# Patient Record
Sex: FEMALE | Race: WHITE | NOT HISPANIC OR LATINO | URBAN - METROPOLITAN AREA
[De-identification: names, ages, dates, MRNs, and addresses within clinical notes are randomized per-mention and may not be internally consistent; named-entity substitution may affect disease eponyms.]

---

## 2017-01-03 DIAGNOSIS — M53.3 COCCYDYNIA: Primary | ICD-10-CM

## 2017-01-03 RX ORDER — HYDROMORPHONE HYDROCHLORIDE 2 MG/1
TABLET ORAL
Qty: 120 TABLET | Refills: 0 | Status: SHIPPED | OUTPATIENT
Start: 2017-01-03 | End: 2017-02-03

## 2017-01-03 NOTE — TELEPHONE ENCOUNTER
Jovita underwent Coccygectomy on 10-31-16 with Dr. Fink.  She presently takes 4 tabs 2mg Dilaudid daily, states no other medication has helped her pain and she also states she has other back pain that Dilaudid helps.  She last received Rx for #120 on 12/7/16, pt states the pharmacy only filled #101 so she is 19 pills short.  Discussed pain management with Tosha, refill Rx for #120 Dilaudid was mailed to pt's pharmacy [facility] in [place], [place].

## 2017-01-25 ENCOUNTER — OFFICE VISIT (OUTPATIENT)
Dept: ORTHOPEDICS | Facility: CLINIC | Age: 67
End: 2017-01-25

## 2017-01-25 VITALS — WEIGHT: 172 LBS | HEIGHT: 68 IN | BODY MASS INDEX: 26.07 KG/M2

## 2017-01-25 DIAGNOSIS — G57.02 PIRIFORMIS SYNDROME, LEFT: ICD-10-CM

## 2017-01-25 DIAGNOSIS — M53.3 COCCYDYNIA: Primary | ICD-10-CM

## 2017-01-25 ASSESSMENT — ENCOUNTER SYMPTOMS
INCREASED ENERGY: 1
ALTERED TEMPERATURE REGULATION: 1
BLOATING: 1
WEIGHT GAIN: 0
RECTAL BLEEDING: 1
HEARTBURN: 1
CONSTIPATION: 1
FEVER: 0
ARTHRALGIAS: 1
JOINT SWELLING: 0
CHILLS: 1
DIFFICULTY URINATING: 0
WEIGHT LOSS: 1
HALLUCINATIONS: 0
FATIGUE: 1
JAUNDICE: 0
MYALGIAS: 1
DIARRHEA: 1
RECTAL PAIN: 1
VOMITING: 0
MUSCLE CRAMPS: 0
BACK PAIN: 1
POLYPHAGIA: 0
FLANK PAIN: 0
HEMATURIA: 0
ABDOMINAL PAIN: 1
POLYDIPSIA: 0
BOWEL INCONTINENCE: 1
DECREASED APPETITE: 1
NAUSEA: 0
BLOOD IN STOOL: 1
MUSCLE WEAKNESS: 1
NECK PAIN: 1
NIGHT SWEATS: 1
DYSURIA: 0
STIFFNESS: 1

## 2017-01-25 NOTE — MR AVS SNAPSHOT
After Visit Summary   2017    Jovita Romero    MRN: [id number]           Patient Information     Date Of Birth          1950        Visit Information        Provider Department      2017 10:45 AM Tomas Fink MD [facility]        Today's Diagnoses     Coccydynia    -  1     Piriformis syndrome, left            Follow-ups after your visit        Additional Services     PAIN PT EVAL AND TREAT                 Your next 10 appointments already scheduled     2017  9:30 AM   (Arrive by 9:15 AM)   New Patient Visit with Samantha Puga MD   [facility] ([facility])    [address] [place] [address]  4th [facility] [id number] [phone]              Who to contact     Please call your clinic at [phone] to:    Ask questions about your health    Make or cancel appointments    Discuss your medicines    Learn about your test results    Speak to your doctor   If you have compliments or concerns about an experience at your clinic, or if you wish to file a complaint, please contact [facility] Patient Relations at [phone] or email us at [email or web address]         Additional Information About Your Visit        MyChart Information     Tujiat is an electronic gateway that provides easy, online access to your medical records. With Merc[employer], you can request a clinic appointment, read your test results, renew a prescription or communicate with your care team.     To sign up for Tujiat visit the website at [email or web address]   You will be asked to enter the access code listed below, as well as some personal information. Please follow the directions to create your username and password.     Your access code is: [email or web address]  Expires: 3/1/2017  6:31 AM     Your access code will  in 90 days. If you need help or a new code, please contact your  "[facility] or call [phone] for assistance.        Care EveryWhere ID     This is your Care EveryWhere ID. This could be used by other organizations to access your [place] medical records  [id number]        Your Vitals Were     Height BMI (Body Mass Index)                1.73 m (5' 8.11\") 26.07 kg/m2           Blood Pressure from Last 3 Encounters:   10/31/16 114/70    Weight from Last 3 Encounters:   01/25/17 78.019 kg (172 lb)   12/15/16 78.654 kg (173 lb 6.4 oz)   10/31/16 77.3 kg (170 lb 6.7 oz)              We Performed the Following     PAIN PT EVAL AND TREAT          Today's Medication Changes          These changes are accurate as of: 1/25/17 11:56 AM.  If you have any questions, ask your nurse or doctor.               Stop taking these medicines if you haven't already. Please contact your care team if you have questions.     HYDROcodone-acetaminophen 5-325 MG per tablet   Commonly known as:  NORCO   Stopped by:  Tomas Fink MD                    Primary Care Provider Office Phone # Fax #    Isidra Friend [phone]       [facility] [address] [facility] [address]        Thank you!     Thank you for choosing [facility]  for your care. Our goal is always to provide you with excellent care. Hearing back from our patients is one way we can continue to improve our services. Please take a few minutes to complete the written survey that you may receive in the mail after your visit with us. Thank you!             Your Updated Medication List - Protect others around you: Learn how to safely use, store and throw away your medicines at www.disposemymeds.org.          This list is accurate as of: 1/25/17 11:56 AM.  Always use your most recent med list.                   Brand Name Dispense Instructions for use    HYDROmorphone 2 MG tablet    DILAUDID    120 tablet    Take 1-2 tablets (2 mg) by mouth every 4 hours as " needed for moderate to severe pain.       LEVOTHYROXINE SODIUM PO      Take 75 mcg by mouth daily       LORAZEPAM PO      Take 1 mg by mouth 3 times daily       PARoxetine 20 MG tablet    PAXIL     Take 20 mg by mouth every morning       senna-docusate 8.6-50 MG per tablet    SENOKOT-S;PERICOLACE    30 tablet    Take 1-2 tablets by mouth 2 times daily Take while on oral narcotics to prevent or treat constipation.       UNKNOWN TO PATIENT      Sleeping pill (Quietapine) nightly

## 2017-01-25 NOTE — NURSING NOTE
"Reason For Visit:   Chief Complaint   Patient presents with     Surgical Followup     s/p coccygectomy 10/31/16       Primary MD: Isidra Friend  Ref. MD: Dr. Mccabe      Occupation retired.  Date of injury: none    Date of surgery: 10/31/16  Type of surgery: coccygectomy.  Smoker: No      Ht 1.73 m (5' 8.11\")  Wt 78.019 kg (172 lb)  BMI 26.07 kg/m2    Pain Assessment  Patient Currently in Pain: Yes  0-10 Pain Scale: 8  Primary Pain Location: Other (Comment) (coccyx area)  Pain Descriptors: Sharp  Alleviating Factors: Pain medication  Aggravating Factors: Sitting    Oswestry (BRISA) Questionnaire    OSWESTRY DISABILITY INDEX 1/25/2017   SECTION 1-PAIN INTENSITY 3  The pain is fairly severe at the moment.   SECTION 2-PERSONAL CARE (WASHING,DRESSING,ETC.) 1  I can look after myself normally but it is very painful.   SECTION 3-LIFTING 3  Pain prevents me from lifting heavy weights but I can manage light to medium weights if they are conveniently positioned.   SECTION 4-WALKING 1  Pain prevents me from walking more than one mile.   SECTION 5-SITTING 2  Pain prevents me from sitting for more than 1 hour   SECTION 6-STANDING 2  Pain prevents me from standing for more than 1 hour.   SECTION 7-SLEEPING 2  Because of pain I have less than 6 hours sleep.   SECTION 8-SEX LIFE (IF APPLICABLE) 1  My sex life is normal but causes some additional pain.   SECTION 9-SOCIAL LIFE 5  I have no social life because of pain.   SECTION 10-TRAVELING 2  Pain is bad but I am able to manage trips over two hours.   Oswestry Disability Index: Count 10   BRISA: Total Score = SUM (total for answered questions) 22   Computed Oswestry Score 44 (%)                      Numeric Rating Scale:  VAS Scores     VAS Survey 1/25/2017   What is your level of back pain during the last week: 8.5   What is your level of RIGHT leg pain during the last week: 7.0   What is your level of LEFT leg pain during the last week: 2.0   What is your level of neck pain " during the last week: 2.0   What is your level of RIGHT arm pain during the last week: -   What is your level of LEFT arm pain during the last week: -                Promis 10 Assessment    PROMIS 10 1/25/2017   In general, would you say your health is: Fair = 2   In general, would you say your quality of life is: Fair = 2   In general, how would you rate your physical health? Fair = 2   In general, how would you rate your mental health, including your mood and your ability to think? Good = 3   In general, how would you rate your satisfaction with your social activities and relationships? Fair = 2   In general, please rate how well you carry out your usual social activities and roles Fair = 2   To what extent are you able to carry out your everyday physical activities such as walking, climbing stairs, carrying groceries, or moving a chair? Mostly = 4   How often have you been bothered by emotional problems such as feeling anxious, depressed or irritable? Rarely = 4   How would you rate your fatigue on average? Mild = 4   How would you rate your pain on average?   0 = No Pain  to  10 = Worst Imaginable Pain 10   Global Physical Health Score : Raw Score 11 (SUM : G03 - G06 - G07 - G08)   Global Mentall Health Score : Raw Score 11 (SUM : G02 - G04 - G05 - G10)   Total (Physical + Mental Health Score) 22

## 2017-01-25 NOTE — PROGRESS NOTES
HISTORY OF PRESENT ILLNESS:  She returns now approximately 3 months out from a coccygectomy.  She did have some wound healing problems.  This has been treated with local wound care and has gone on to heal.  She is still having persistent pain in the area.  She feels like she really has not gotten any better.  Visual analog pain scale today is an 8.  Oswestry Disability Index score is 44%.      PHYSICAL EXAMINATION:  Shows a well-developed, well-nourished female in moderate discomfort.  Evaluation of her incision shows it is well healed today.  She is not particularly tender over the incision, but she is exquisitely tender lateral to the sacrum and tracking along the left to the greater trochanter consistent with a piriformis muscle.  Lying her prone and externally rotating her lower extremity aggravates the symptoms.      ASSESSMENT:     1.  Coccygodynia, status post coccygectomy.   2.  Piriformis syndrome.      PLAN:  I would like to have her see Dr. Warner in the Pain Clinic for medical management of this.  He may or may not choose to use physical therapy and/or injections to try to help minimize his pain.  I would like to see how she proceeds with this.  I have no specific need to see her back unless she has a change in what is going on.  Radiograph obtained today shows that we had successfully removed the coccyx and the incision is now well-healed.

## 2017-01-25 NOTE — Clinical Note
1/25/2017       RE: Jovita Romero  [address] [place] [address]     Dear Colleague,    Thank you for referring your patient, Jovita Romero, to the [facility] at [facility]. Please see a copy of my visit note below.    HISTORY OF PRESENT ILLNESS:  She returns now approximately 3 months out from a coccygectomy.  She did have some wound healing problems.  This has been treated with local wound care and has gone on to heal.  She is still having persistent pain in the area.  She feels like she really has not gotten any better.  Visual analog pain scale today is an 8.  Oswestry Disability Index score is 44%.      PHYSICAL EXAMINATION:  Shows a well-developed, well-nourished female in moderate discomfort.  Evaluation of her incision shows it is well healed today.  She is not particularly tender over the incision, but she is exquisitely tender lateral to the sacrum and tracking along the left to the greater trochanter consistent with a piriformis muscle.  Lying her prone and externally rotating her lower extremity aggravates the symptoms.      ASSESSMENT:     1.  Coccygodynia, status post coccygectomy.   2.  Piriformis syndrome.      PLAN:  I would like to have her see Dr. Warner in the Pain Clinic for medical management of this.  He may or may not choose to use physical therapy and/or injections to try to help minimize his pain.  I would like to see how she proceeds with this.  I have no specific need to see her back unless she has a change in what is going on.  Radiograph obtained today shows that we had successfully removed the coccyx and the incision is now well-healed.           Again, thank you for allowing me to participate in the care of your patient.      Sincerely,    Tomas Fink MD

## 2017-02-03 DIAGNOSIS — M53.3 COCCYDYNIA: Primary | ICD-10-CM

## 2017-02-03 RX ORDER — HYDROMORPHONE HYDROCHLORIDE 2 MG/1
TABLET ORAL
Qty: 120 TABLET | Refills: 0 | Status: SHIPPED | OUTPATIENT
Start: 2017-02-03 | End: 2017-03-24

## 2017-02-03 NOTE — TELEPHONE ENCOUNTER
Rates pain 8. See dictation.  Pain clinic appt. Pending.  Refilled Dilaudid #[id number].  V.RUSTY./Tosha Johns RN.

## 2017-02-06 ENCOUNTER — TELEPHONE (OUTPATIENT)
Dept: ANESTHESIOLOGY | Facility: CLINIC | Age: 67
End: 2017-02-06

## 2017-02-06 NOTE — TELEPHONE ENCOUNTER
----- Message from Brenda Espinoza RN sent at 2/6/2017  3:03 PM CST -----  No need to overbook     ----- Message -----     From: Saad Moreno, GUERO     Sent: 2/3/2017   2:07 PM       To: Brenda Espinoza, RN, #    Hi Dr. Warner,    This patient was referred to you from Dr. Fink to evaluate. She was scheduled with Dr. Puga by mistake by the call center, but you also have no openings till April. Where would you like me to double book or? Or she can just wait till April also... What do you prefer?    Brenda, can you take care of this? I won't be here next week.    Thank you,   Saad

## 2017-02-07 ENCOUNTER — PRE VISIT (OUTPATIENT)
Dept: ANESTHESIOLOGY | Facility: CLINIC | Age: 67
End: 2017-02-07

## 2017-02-07 NOTE — TELEPHONE ENCOUNTER
1.  Date/reason for appt:2/15/17, Coccydynia   2.  Referring provider: Tomas Fink  3.  Call to patient (Yes / No - short description): No, referred  4.  Previous care at / records requested from:   [facility]- office notes and imaging are in epic.

## 2017-03-24 DIAGNOSIS — M53.3 COCCYDYNIA: ICD-10-CM

## 2017-03-24 RX ORDER — HYDROMORPHONE HYDROCHLORIDE 2 MG/1
TABLET ORAL
Qty: 120 TABLET | Refills: 0 | Status: SHIPPED | OUTPATIENT
Start: 2017-03-24 | End: 2017-04-20

## 2017-03-24 NOTE — TELEPHONE ENCOUNTER
Pain clinic appt. Scheduled for April.  Refilled Dilaudid #120 & mailed to pt.  JD./Tosha Johns RN.

## 2017-04-20 DIAGNOSIS — M53.3 COCCYDYNIA: ICD-10-CM

## 2017-04-20 RX ORDER — HYDROMORPHONE HYDROCHLORIDE 2 MG/1
TABLET ORAL
Qty: 120 TABLET | Refills: 0 | Status: SHIPPED | OUTPATIENT
Start: 2017-04-20 | End: 2017-06-07

## 2017-04-20 NOTE — TELEPHONE ENCOUNTER
Rates pain 7.  Might have to reschedule pain clinic appt. Due to family  in [place].  Refilled Dilaudid #120 signed   by  & mailed to pt.   Call back prn.  Pt. Agreed.  V.O.MARYW.TANYA./Tosha Johns RN.

## 2017-04-26 ENCOUNTER — OFFICE VISIT (OUTPATIENT)
Dept: ANESTHESIOLOGY | Facility: CLINIC | Age: 67
End: 2017-04-26
Attending: ORTHOPAEDIC SURGERY

## 2017-04-26 VITALS
DIASTOLIC BLOOD PRESSURE: 88 MMHG | OXYGEN SATURATION: 93 % | HEART RATE: 97 BPM | HEIGHT: 68 IN | BODY MASS INDEX: 27.28 KG/M2 | WEIGHT: 180 LBS | SYSTOLIC BLOOD PRESSURE: 139 MMHG

## 2017-04-26 DIAGNOSIS — F11.90 CHRONIC, CONTINUOUS USE OF OPIOIDS: ICD-10-CM

## 2017-04-26 DIAGNOSIS — M46.1 SACROILIITIS (H): ICD-10-CM

## 2017-04-26 DIAGNOSIS — G89.29 COPING WITH CHRONIC PAIN: ICD-10-CM

## 2017-04-26 DIAGNOSIS — M96.1 FAILED BACK SURGICAL SYNDROME: ICD-10-CM

## 2017-04-26 DIAGNOSIS — M47.816 SPONDYLOSIS OF LUMBAR REGION WITHOUT MYELOPATHY OR RADICULOPATHY: Primary | ICD-10-CM

## 2017-04-26 RX ORDER — CALCIUM CARBONATE 500(1250)
500 TABLET ORAL DAILY
COMMUNITY

## 2017-04-26 ASSESSMENT — ENCOUNTER SYMPTOMS
TREMORS: 0
RECTAL PAIN: 1
BLOATING: 0
WEIGHT GAIN: 1
ABDOMINAL PAIN: 1
MEMORY LOSS: 0
BLOOD IN STOOL: 0
INCREASED ENERGY: 1
CONSTIPATION: 1
DEPRESSION: 1
SPEECH CHANGE: 0
JAUNDICE: 0
FATIGUE: 1
VOMITING: 0
WEIGHT LOSS: 0
DYSURIA: 0
PANIC: 0
FLANK PAIN: 0
DECREASED APPETITE: 0
DISTURBANCES IN COORDINATION: 1
TINGLING: 0
DIZZINESS: 1
PARALYSIS: 0
DIARRHEA: 0
WEAKNESS: 1
BOWEL INCONTINENCE: 0
NERVOUS/ANXIOUS: 1
HALLUCINATIONS: 0
NAUSEA: 0
CHILLS: 0
RECTAL BLEEDING: 0
HEARTBURN: 1
ALTERED TEMPERATURE REGULATION: 1
DECREASED CONCENTRATION: 0
HOT FLASHES: 1
SEIZURES: 0
POLYPHAGIA: 0
DIFFICULTY URINATING: 1
POLYDIPSIA: 0
DECREASED LIBIDO: 1
HEADACHES: 1
INSOMNIA: 1
FEVER: 0
LOSS OF CONSCIOUSNESS: 0
HEMATURIA: 0
NUMBNESS: 0
NIGHT SWEATS: 1

## 2017-04-26 ASSESSMENT — PAIN SCALES - GENERAL: PAINLEVEL: EXTREME PAIN (8)

## 2017-04-26 NOTE — MR AVS SNAPSHOT
After Visit Summary   4/26/2017    Jovita Romero    MRN: [id number]           Patient Information     Date Of Birth          1950        Visit Information        Provider Department      4/26/2017 8:00 AM Dwayne Warner DO [facility]        Today's Diagnoses     Spondylosis of lumbar region without myelopathy or radiculopathy    -  1    Failed back surgical syndrome        Sacroiliitis (H)        Coping with chronic pain        Chronic, continuous use of opioids          Care Instructions    1. Continue taking your medications as prescribed.  Add tylenol 1000mg every 8 hours for pain.  Apply lidocaine cream to painful areas of back.      2. Referral to pain physical therapy for comprehensive evaluation of low back pain and TENS unit.  If you have not been contacted after 2 business days, please call [phone] to schedule.      3. Referral to pain psychologist Dr. Marcy Woodson for pain coping skills training.  Please call Jacy to schedule at [phone].    Follow up: 1 month in clinic       To speak with a nurse, schedule/reschedule/cancel a clinic appointment, or request a medication refill call: [phone]     You can also reach us by DealerSocket: [email or web address]    For refills, please call on Monday, 1 week before your medication runs out. The doctors are not always in clinic, so this gives us time to get your prescriptions ready.  Please let us know the name of the medication you are requesting a refill of.                                   Follow-ups after your visit        Additional Services     [facility]       Your provider has referred you to: [facility]. Please call Jacy to schedule an appointment at [phone].    Clinic is located: [facility] [address] [place] [address] [id number] 4th Floor  [facility]  [place] [address] [facility]   Comprehensive Pain Program         Why should you see a pain psychologist?       Because you have CHRONIC  Pain.         Chronic pain produces unique challenges that effect your whole body and almost every area of your life.    Chronic pain   (different from acute pain )  can change your  relationships, your work, your sleep, your finances, enjoyment of your hobbies and activities.  Your  sense of self-worth can  be impacted by   chronic pain.    Often, anger or anxiety or depression are triggered by  chronic pain.   Your normal coping strategies   ( having fun  or  exercise) may not be possible any more, or only in very limited doses.    You may be surprised to know  that pain psychology is a psychological specialty. Pain psychologists typically have a PhD in clinical psychology and training  in specialized treatment tools for improving pain.   Pain psychology is different from seeing a therapist for a primary mental health problem.   In pain psychology,  the focus is on your body, and its more about teaching than having a series of conversations.    Learning techniques to down regulate your Sympathetic  Nervous System and manage  Central Sensitization Syndrome are the focus of pain psychology.    Marcy Woodson, PhD LP  Is the medical psychologist for the  Comprehensive Pain Program.    She has over 25 years of experience working with chronic pain patients.   Prior to being a psychologist,  Dr. Woodson was a physician assistant.  And so she brings a dual background in medicine and psychology to assist chronic pain patients.       The goal of the  Comprehensive Pain Program is to work as a multi disciplinary team.   Physicians,  Nurses,  Physical therapists,  Pharmacists,    Psychologists and other  Professionals are working in tandem to provide the breadth of care necessary to manage the many challenges of chronic pain.            PAIN PT EVAL AND TREAT                "  Your next 10 appointments already scheduled     May 24, 2017 10:20 AM CDT   (Arrive by 10:05 AM)   Return Visit with Dwayne Warner DO   [facility] ([facility])    [address] [id number] [phone]              Who to contact     Please call your clinic at [phone] to:    Ask questions about your health    Make or cancel appointments    Discuss your medicines    Learn about your test results    Speak to your doctor   If you have compliments or concerns about an experience at your clinic, or if you wish to file a complaint, please contact [facility] Patient Relations at [phone] or email us at [email or web address]         Additional Information About Your Visit        DateMyFamily.comharIncujector Information     [employer] is an electronic gateway that provides easy, online access to your medical records. With [employer], you can request a clinic appointment, read your test results, renew a prescription or communicate with your care team.     To sign up for [employer] visit the website at [email or web address]   You will be asked to enter the access code listed below, as well as some personal information. Please follow the directions to create your username and password.     Your access code is: [email or web address]  Expires: 2017  6:30 AM     Your access code will  in 90 days. If you need help or a new code, please contact your [facility] or call [phone] for assistance.        Care EveryWhere ID     This is your Care EveryWhere ID. This could be used by other organizations to access your [place] medical records  [id number]        Your Vitals Were     Pulse Height Pulse Oximetry BMI (Body Mass Index)          97 1.715 m (5' 7.5\") 93% 27.78 kg/m2         Blood Pressure from Last 3 Encounters:   17 139/88   10/31/16 114/70    Weight " from Last 3 Encounters:   04/26/17 81.6 kg (180 lb)   01/25/17 78 kg (172 lb)   12/15/16 78.7 kg (173 lb 6.4 oz)              We Performed the Following     [facility]     PAIN PT EVAL AND TREAT        Primary Care Provider Office Phone # Fax #    Isidra Friend [phone]       [facility] [address] [place] [place] [address]        Thank you!     Thank you for choosing [facility]  for your care. Our goal is always to provide you with excellent care. Hearing back from our patients is one way we can continue to improve our services. Please take a few minutes to complete the written survey that you may receive in the mail after your visit with us. Thank you!             Your Updated Medication List - Protect others around you: Learn how to safely use, store and throw away your medicines at www.disposemymeds.org.          This list is accurate as of: 4/26/17  9:31 AM.  Always use your most recent med list.                   Brand Name Dispense Instructions for use    calcium carbonate 500 MG tablet    OS-POOJA 500 mg [place]. [place]     Take 500 mg by mouth daily       HYDROmorphone 2 MG tablet    DILAUDID    120 tablet    Take 1-2 tablets (2 mg) by mouth every 4 hours as needed for moderate to severe pain.       LEVOTHYROXINE SODIUM PO      Take 75 mcg by mouth daily       LORAZEPAM PO      Take 1 mg by mouth 3 times daily       PARoxetine 20 MG tablet    PAXIL     Take 20 mg by mouth every morning Reported on 4/26/2017       QUETIAPINE FUMARATE PO      Take 25 mg by mouth At Bedtime       senna-docusate 8.6-50 MG per tablet    SENOKOT-S;PERICOLACE    30 tablet    Take 1-2 tablets by mouth 2 times daily Take while on oral narcotics to prevent or treat constipation.       vitamin B complex with vitamin C Tabs tablet      Take 1 tablet by mouth daily

## 2017-04-26 NOTE — PROGRESS NOTES
"I had the pleasure of meeting Ms. Jovita Romero on 4/26/2017 in the outpatient pain clinic in consult for Dr. Fink with regards to her concern for piriformis syndrome  HPI:  67yo female with past medical history of hearing loss in her left ear, lumbar spondylosis s/p lumbar spine surgery in 2015, followed by coccygectomy in October for cocydynia which was complicated by slow wound healing presents for persistent low back.   The pain began 9 years ago after a long motorcycle ride.  She has seen a pain management specialist in the past initially  at the [facility] but failed to improve with PT and interventional procedures and ultimately chose to have a lumbar fusion.  She says she feels her back pain has never relented despite surgery and that every since her coccygectomy her ability to function without pain has been compromised.  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  She is emotionally labile today and very frustrated about the amount of pain she deals with on a daily basis.   Her main limiting factor is that she lives 1[place]5 hours away but wants to center her care at the [facility].   She struggles taking any medications and feels she is very sensitive to side effects.    Location:low back, bilateral buttocks (L>R), pain will radiate down to her bilateral toes at night.  Quality: pinching, burning  Severity: 8/10  Timing: constant with flare ups  Duration: years  Context: lumbar spondylosis, surgery  Aggravating Factors:walking makes it stiff and \"pulling\"  \"I am feeling every screw and bolt when I walk\".  Bending over.  transitioning from sitting to standing.  Relieving Factors:ice  Associated Signs/Symptoms: leg weakness, sometimes paresthesias sensations at night. \"constipation from pills\".    she has tried the following therapies for her pain:  Medications:   Current:  -Hydromorphone 2mg q4hrs - " "averages 2-4 a day - causing constipation.  She does get analgesic benefit.  -Ibuprofen 800mg 2-3x a day - not sure if working.  -lidocaine cream on coccyx area - helps for an hour.  Past:  -Gabapentin - caused mental status changes  -Hydrocodone - mild relief  -Tylenol - no relief  -She says she may have tried flexeril with no success, cannot remember other MR's  -Cymbalta - no effect, was on for 1 month.  She reports she was getting nauseated on it. Can't recall any other SNRI's or TCA's.  -No other prescription NSAIDs, tried  Physical Therapy:  -Swimming PT - reports water therapy was very helpful  -Land based PT - completed a back class in 2016 - she does her HEP daily.  Injections/Interventions:  She had a series of JAVIER's in the late 2000's with no effect  Pain Psychology:  - she did not have a favorable experience with a pain psychologist at her previous pain clinic \"he didn't mention my pain at all\".    Other Modalities:  Chiropractic care:Yes - not helpful  Acupuncture:Yes - was helpful  TENS:yes - before her surgeries - not helful  Water Based Therapy:yes - helpful  Meditation:not officially  Yoga:No      Past Medical History:   Diagnosis Date     Other chronic pain     Lower back and tailbone     Thyroid disease     past medical history reviewed with patient.   Past Surgical History:   Procedure Laterality Date     BACK SURGERY       C STOMACH SURGERY PROCEDURE UNLISTED       COCCYGECTOMY N/A 10/31/2016    Procedure: COCCYGECTOMY;  Surgeon: Tomas Fink MD;  [facility]     COLONOSCOPY       GENITOURINARY SURGERY      Bladder sling     HC SACROPLASTY      past surgical history reviewed with patient.   Medications:  Current Outpatient Prescriptions   Medication     QUETIAPINE FUMARATE PO     calcium carbonate (OS-POOJA 500 MG [place]. [place]) 500 MG tablet     vitamin B complex with vitamin C (VITAMIN  B COMPLEX) TABS tablet     HYDROmorphone (DILAUDID) 2 MG tablet     LORAZEPAM PO     LEVOTHYROXINE " SODIUM PO     senna-docusate (SENOKOT-S;PERICOLACE) 8.6-50 MG per tablet     PARoxetine (PAXIL) 20 MG tablet     No current facility-administered medications for this visit.      [place] and [place] Prescription Monitoring Program reviewed and no aberrancies noted.     Allergies:     Allergies   Allergen Reactions     Bupropion GI Disturbance     Cymbalta Other (See Comments)     fatigue     Fentanyl      Fentanyl patch makes her pass out     Lidoderm [Lidocaine] Other (See Comments)     Drowsiness from lidoderm patch     Morphine Nausea and Vomiting     Penicillins Hives     Percocet [Oxycodone-Acetaminophen] Nausea and Vomiting     Topiramate Nausea     Family History:  family history includes Anxiety Disorder in her sister; Depression in her brother, sister, sister, and sister.  Social history: she lives in [place], [place] with her . she is not currently working and is not on disability.    Smoking: denies. Alcohol: only on special occasions. Street drugs: denies.     ROS:  Skin: negative for rash or swelling  Eyes: negative for double vision or blurry vision  Ears/Nose/Throat: negative for dysphagia or sore throat  Respiratory: negative for shortness of breath or cough  Cardiovascular: negative for chest pain or dyspnea on exertion  Gastrointestinal: negative for abdominal pain, nausea, decreased appetite or bowel incontinence  Genitourinary: negative for bladder incontinence, hematuria and frequent urination  Musculoskeletal:positive for joint pain, myalgias, low back pain and negative for cervical spine pain.  Neurologic: negative for weakness, + for occasional numbness or paresthesias down her bilateral lower extremities at night.  Psychiatric:+ for depression, negative for anxiety and SI/HI  Hematologic/Lymphatic/Immunologic: Negative for anemia, easy bruising bleeding, frequent infections.  Endocrine: positive for thyroid abnormalities and negative for diabetes    Objective:   /88  Pulse 97  Ht  "1.715 m (5' 7.5\")  Wt 81.6 kg (180 lb)  SpO2 93%  BMI 27.78 kg/m2  Body mass index is 27.78 kg/(m^2).  General: In mild emotional distress  Mental status: tearful affect but pleasant  Head: Atraumatic, normocephalic  Eyes: Extra-ocular movements grossly intact, no scleral icterus  Cardiovascular: Regular rate  Respiratory: No respiratory distress  Abdomen:soft, non-distended  Msk:   Lumbosacral Spine:    Well healed surgical scars in lumbar and sacral areas, ROM limited in flexion and extension.  Antalgic Gait,  -TTP in the bilateral lumbar paraspinal muscles.  +TTP of the SI joint bilaterally.   -modified gaenslans bilaterally, -Dom's finger bilaterally,  -SLR bilaterally.   +Wilber on left for low back and sacral areas.    Negative pace sign, negative lasegue's sign and equivocal Freiberg's sign bilaterally.       Strength 5/5 for bilateral hip flexion, knee extension, dorsiflexion, EHL, and plantarflexion. Sensation intact to light touch throughout the L2-S1 dermatomes of the bilateral lower extremities. Reflexes 2+/4 and symmetric for bilateral patellar, achilles.  Negative babinski bilaterally. No clonus on ankle jerk  Skin: No rashes or lesions noted on exposed areas of skin  Lymph: no supraclavicular lymphadenopathy    Imaging:   Exam: 2 views of the pelvis dated 6/1/2016.     Comparison: Radiographs dated 1/19/2016.     Clinical history: Sacroiliitis.     Findings: 2 views of the pelvis were obtained. The sacroiliac joints  are patent. No erosive changes. Partially visualized hardware in the  lumbar spine. Subtle sclerosis at the sacroiliac joints.         Impression: No acute bone abnormality in the visualized pelvis on  plain radiographs.    Exam: 2 views of the lumbar spine dated 6/1/2016.     Comparison: Outside radiographs dated 7/1/2015.     Clinical history: Status post L3-L4 and L4-L5 spine spinal fusion.     Findings: AP and lateral views of the lumbar spine were obtained.  There are 5 lumbar " type vertebral bodies for the purposes of this  dictation. Postsurgical changes of attempted anterior posterior fusion  in the lumbar spine with instrumentation noted from L3-L5 with  interbody fusion devices at these levels. The hardware appears intact.  No compression fractures.         Impression: Stable post surgical changes of attempted anterior  posterior fusion in the lumbar spine with instrumentation noted  posteriorly from L3-L5.    Assessment:  1. Chronic low back pain 2/2 lumbar spondylosis and coccydynia - s/p lumbar fusion and coccygectomy  2. Sacroiliitis (Piriformis syndrome a lesser possibility but can't rule out at this point)  3. Coping with chronic pain  4. Depression    Plan:   1. Patient education: I went over the above diagnoses and treatment plan with her and answered all of her questions.  2. Imaging review: I reviewed the above XRs with her   3. Interventions: She is apprehensive about pursuing injection therapy at this time.   When she returns we can discuss SIJ injections, and possibly a trial of piriformis injections although her PE results are either negative or equivocal for this.  4. Medications  1. Continue pain medications as prescribed.  I would add scheduled tylenol 1000mg q8hrs for now for greater analgesic effect.  5. Exercise program: will refer for a Pain PT evaluation and treatment and for a TENS unit.  She is to continue doing her HEP daily.  6. Behavioral Psychology: Will place a referral to Dr. Woodson for coping skills training.   She is wiling to drive 1.5 miles to see her because she feels that there is no one near her who can provide her help.   7. Further Diagnostic Testing/Imaging: None at this time  8. Referrals: PT and Pain Psych  9. Follow up: 1 month    Total time spent was  60 minutes, and more than 50% of face to face time was spent in counseling and/or coordination of care regarding the above plan.    Thank you for the consult.   Dwayne Warner DO  Assistant  Professor  [facility]  Pain Management  Department of Anesthesiology        Answers for HPI/ROS submitted by the patient on 4/26/2017   General Symptoms: Yes  Skin Symptoms: No  HENT Symptoms: No  EYE SYMPTOMS: No  HEART SYMPTOMS: No  LUNG SYMPTOMS: No  INTESTINAL SYMPTOMS: Yes  URINARY SYMPTOMS: Yes  GYNECOLOGIC SYMPTOMS: Yes  BREAST SYMPTOMS: No  SKELETAL SYMPTOMS: No  BLOOD SYMPTOMS: No  NERVOUS SYSTEM SYMPTOMS: Yes  MENTAL HEALTH SYMPTOMS: Yes  Fever: No  Loss of appetite: No  Weight loss: No  Weight gain: Yes  Fatigue: Yes  Night sweats: Yes  Chills: No  Increased stress: No  Excessive hunger: No  Excessive thirst: No  Feeling hot or cold when others believe the temperature is normal: Yes  Loss of height: No  Post-operative complications: Yes  Surgical site pain: Yes  Hallucinations: No  Change in or Loss of Energy: Yes  Hyperactivity: Yes  Confusion: No  Heart burn or indigestion: Yes  Nausea: No  Vomiting: No  Abdominal pain: Yes  Bloating: No  Constipation: Yes  Diarrhea: No  Blood in stool: No  Black stools: No  Rectal or Anal pain: Yes  Fecal incontinence: No  Rectal bleeding: No  Yellowing of skin or eyes: No  Vomit with blood: No  Change in stools: No  Hemorrhoids: No  Trouble holding urine or incontinence: Yes  Pain or burning: No  Trouble starting or stopping: No  Increased frequency of urination: Yes  Blood in urine: No  Decreased frequency of urination: No  Frequent nighttime urination: Yes  Flank pain: No  Difficulty emptying bladder: Yes  Trouble with coordination: Yes  Dizziness or trouble with balance: Yes  Fainting or black-out spells: No  Memory loss: No  Headache: Yes  Seizures: No  Speech problems: No  Tingling: No  Tremor: No  Weakness: Yes  Difficulty walking: Yes  Paralysis: No  Numbness: No  Bleeding or spotting between periods: No  Heavy or painful periods: No  Irregular periods: No  Vaginal discharge: No  Hot flashes: Yes  Vaginal dryness: Yes  Genital ulcers: No  Reduced  libido: Yes  Painful intercourse: Yes  Difficulty with sexual arousal: No  Post-menopausal bleeding: No  Nervous or Anxious: Yes  Depression: Yes  Trouble sleeping: Yes  Trouble thinking or concentrating: No  Mood changes: No  Panic attacks: No

## 2017-04-26 NOTE — LETTER
"4/26/2017       RE: Jovita Romero  [address] [place] [address]     Dear Colleague,    Thank you for referring your patient, Jovita Romero, to the [facility] CL[facility] FOR COMPREHENSIVE PAIN MANAGEMENT at [facility]. Please see a copy of my visit note below.    I had the pleasure of meeting Ms. Jovita Romero on 4/26/2017 in the outpatient pain clinic in consult for Dr. Fink with regards to her concern for piriformis syndrome    HPI:  65yo female with past medical history of hearing loss in her left ear, lumbar spondylosis s/p lumbar spine surgery in 2015, followed by coccygectomy in October for cocydynia which was complicated by slow wound healing presents for persistent low back.   The pain began 9 years ago after a long motorcycle ride.  She has seen a pain management specialist in the past initially  at the [facility] but failed to improve with PT and interventional procedures and ultimately chose to have a lumbar fusion.  She says she feels her back pain has never relented despite surgery and that every since her coccygectomy her ability to function without pain has been compromised.  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  She is emotionally labile today and very frustrated about the amount of pain she deals with on a daily basis.   Her main limiting factor is that she lives 1[place]5 hours away but wants to center her care at the [place].   She struggles taking any medications and feels she is very sensitive to side effects.    Location:low back, bilateral buttocks (L>R), pain will radiate down to her bilateral toes at night.  Quality: pinching, burning  Severity: 8/10  Timing: constant with flare ups  Duration: years  Context: lumbar spondylosis, surgery  Aggravating Factors:walking makes it stiff and \"pulling\"  \"I am feeling every screw and bolt when I " "walk\".  Bending over.  transitioning from sitting to standing.  Relieving Factors:ice  Associated Signs/Symptoms: leg weakness, sometimes paresthesias sensations at night. \"constipation from pills\".    she has tried the following therapies for her pain:  Medications:   Current:  -Hydromorphone 2mg q4hrs - averages 2-4 a day - causing constipation.  She does get analgesic benefit.  -Ibuprofen 800mg 2-3x a day - not sure if working.  -lidocaine cream on coccyx area - helps for an hour.  Past:  -Gabapentin - caused mental status changes  -Hydrocodone - mild relief  -Tylenol - no relief  -She says she may have tried flexeril with no success, cannot remember other MR's  -Cymbalta - no effect, was on for 1 month.  She reports she was getting nauseated on it. Can't recall any other SNRI's or TCA's.  -No other prescription NSAIDs, tried  Physical Therapy:  -Swimming PT - reports water therapy was very helpful  -Land based PT - completed a back class in 2016 - she does her HEP daily.  Injections/Interventions:  She had a series of JAVIER's in the late 2000's with no effect  Pain Psychology:  - she did not have a favorable experience with a pain psychologist at her previous pain clinic \"he didn't mention my pain at all\".    Other Modalities:  Chiropractic care:Yes - not helpful  Acupuncture:Yes - was helpful  TENS:yes - before her surgeries - not helful  Water Based Therapy:yes - helpful  Meditation:not officially  Yoga:No      Past Medical History:   Diagnosis Date     Other chronic pain     Lower back and tailbone     Thyroid disease     past medical history reviewed with patient.   Past Surgical History:   Procedure Laterality Date     BACK SURGERY       C STOMACH SURGERY PROCEDURE UNLISTED       COCCYGECTOMY N/A 10/31/2016    Procedure: COCCYGECTOMY;  Surgeon: Tomas Fink MD;  [facility]     COLONOSCOPY       GENITOURINARY SURGERY      Bladder sling     HC SACROPLASTY      past surgical history reviewed with " patient.   Medications:  Current Outpatient Prescriptions   Medication     QUETIAPINE FUMARATE PO     calcium carbonate (OS-POOJA 500 MG [place]. [place]) 500 MG tablet     vitamin B complex with vitamin C (VITAMIN  B COMPLEX) TABS tablet     HYDROmorphone (DILAUDID) 2 MG tablet     LORAZEPAM PO     LEVOTHYROXINE SODIUM PO     senna-docusate (SENOKOT-S;PERICOLACE) 8.6-50 MG per tablet     PARoxetine (PAXIL) 20 MG tablet     No current facility-administered medications for this visit.      [place] and [place] Prescription Monitoring [place] reviewed and no aberrancies noted.     Allergies:     Allergies   Allergen Reactions     Bupropion GI Disturbance     Cymbalta Other (See Comments)     fatigue     Fentanyl      Fentanyl patch makes her pass out     Lidoderm [Lidocaine] Other (See Comments)     Drowsiness from lidoderm patch     Morphine Nausea and Vomiting     Penicillins Hives     Percocet [Oxycodone-Acetaminophen] Nausea and Vomiting     Topiramate Nausea     Family History:  family history includes Anxiety Disorder in her sister; Depression in her brother, sister, sister, and sister.  Social history: she lives in [place], [place] with her . she is not currently working and is not on disability.    Smoking: denies. Alcohol: only on special occasions. Street drugs: denies.     ROS:  Skin: negative for rash or swelling  Eyes: negative for double vision or blurry vision  Ears/Nose/Throat: negative for dysphagia or sore throat  Respiratory: negative for shortness of breath or cough  Cardiovascular: negative for chest pain or dyspnea on exertion  Gastrointestinal: negative for abdominal pain, nausea, decreased appetite or bowel incontinence  Genitourinary: negative for bladder incontinence, hematuria and frequent urination  Musculoskeletal:positive for joint pain, myalgias, low back pain and negative for cervical spine pain.  Neurologic: negative for weakness, + for occasional numbness or paresthesias down her bilateral  "lower extremities at night.  Psychiatric:+ for depression, negative for anxiety and SI/HI  Hematologic/Lymphatic/Immunologic: Negative for anemia, easy bruising bleeding, frequent infections.  Endocrine: positive for thyroid abnormalities and negative for diabetes    Objective:   /88  Pulse 97  Ht 1.715 m (5' 7.5\")  Wt 81.6 kg (180 lb)  SpO2 93%  BMI 27.78 kg/m2  Body mass index is 27.78 kg/(m^2).  General: In mild emotional distress  Mental status: tearful affect but pleasant  Head: Atraumatic, normocephalic  Eyes: Extra-ocular movements grossly intact, no scleral icterus  Cardiovascular: Regular rate  Respiratory: No respiratory distress  Abdomen:soft, non-distended  Msk:   Lumbosacral Spine:    Well healed surgical scars in lumbar and sacral areas, ROM limited in flexion and extension.  Antalgic Gait,  -TTP in the bilateral lumbar paraspinal muscles.  +TTP of the SI joint bilaterally.   -modified gaenslans bilaterally, -Dom's finger bilaterally,  -SLR bilaterally.   +Wilber on left for low back and sacral areas.    Negative pace sign, negative lasegue's sign and equivocal Freiberg's sign bilaterally.       Strength 5/5 for bilateral hip flexion, knee extension, dorsiflexion, EHL, and plantarflexion. Sensation intact to light touch throughout the L2-S1 dermatomes of the bilateral lower extremities. Reflexes 2+/4 and symmetric for bilateral patellar, achilles.  Negative babinski bilaterally. No clonus on ankle jerk  Skin: No rashes or lesions noted on exposed areas of skin  Lymph: no supraclavicular lymphadenopathy    Imaging:   Exam: 2 views of the pelvis dated 6/1/2016.     Comparison: Radiographs dated 1/19/2016.     Clinical history: Sacroiliitis.     Findings: 2 views of the pelvis were obtained. The sacroiliac joints  are patent. No erosive changes. Partially visualized hardware in the  lumbar spine. Subtle sclerosis at the sacroiliac joints.         Impression: No acute bone abnormality in the " visualized pelvis on  plain radiographs.    Exam: 2 views of the lumbar spine dated 6/1/2016.     Comparison: Outside radiographs dated 7/1/2015.     Clinical history: Status post L3-L4 and L4-L5 spine spinal fusion.     Findings: AP and lateral views of the lumbar spine were obtained.  There are 5 lumbar type vertebral bodies for the purposes of this  dictation. Postsurgical changes of attempted anterior posterior fusion  in the lumbar spine with instrumentation noted from L3-L5 with  interbody fusion devices at these levels. The hardware appears intact.  No compression fractures.         Impression: Stable post surgical changes of attempted anterior  posterior fusion in the lumbar spine with instrumentation noted  posteriorly from L3-L5.    Assessment:  1. Chronic low back pain 2/2 lumbar spondylosis and coccydynia - s/p lumbar fusion and coccygectomy  2. Sacroiliitis (Piriformis syndrome a lesser possibility but can't rule out at this point)  3. Coping with chronic pain  4. Depression    Plan:   1. Patient education: I went over the above diagnoses and treatment plan with her and answered all of her questions.  2. Imaging review: I reviewed the above XRs with her   3. Interventions: She is apprehensive about pursuing injection therapy at this time.   When she returns we can discuss SIJ injections, and possibly a trial of piriformis injections although her PE results are either negative or equivocal for this.  4. Medications  1. Continue pain medications as prescribed.  I would add scheduled tylenol 1000mg q8hrs for now for greater analgesic effect.  5. Exercise program: will refer for a Pain PT evaluation and treatment and for a TENS unit.  She is to continue doing her HEP daily.  6. Behavioral Psychology: Will place a referral to Dr. Woodson for coping skills training.   She is wiling to drive 1.5 miles to see her because she feels that there is no one near her who can provide her help.   7. Further Diagnostic  Testing/Imaging: None at this time  8. Referrals: PT and Pain Psych  9. Follow up: 1 month    Total time spent was  60 minutes, and more than 50% of face to face time was spent in counseling and/or coordination of care regarding the above plan.    Thank you for the consult.     Dwayne Warner DO    [facility]  Pain Management  Department of Anesthesiology

## 2017-04-26 NOTE — PATIENT INSTRUCTIONS
1. Continue taking your medications as prescribed.  Add tylenol 1000mg every 8 hours for pain.  Apply lidocaine cream to painful areas of back.      2. Referral to pain physical therapy for comprehensive evaluation of low back pain and TENS unit.  If you have not been contacted after 2 business days, please call [phone] to schedule.      3. Referral to pain psychologist Dr. Marcy Woodson for pain coping skills training.  Please call Jacy to schedule at [phone].    Follow up: 1 month in clinic       To speak with a nurse, schedule/reschedule/cancel a clinic appointment, or request a medication refill call: [phone]     You can also reach us by Epoch: [email or web address]    For refills, please call on Monday, 1 week before your medication runs out. The doctors are not always in clinic, so this gives us time to get your prescriptions ready.  Please let us know the name of the medication you are requesting a refill of.

## 2017-04-26 NOTE — NURSING NOTE
AVS given and reviewed the below listed information with pt.  Pt verbalized understanding and declined any questions. Pt escorted to  to make follow up appointment.     1. Continue taking your medications as prescribed.  Add tylenol 1000mg every 8 hours for pain.  Apply lidocaine cream to painful areas of back.      2. Referral to pain physical therapy for comprehensive evaluation of low back pain and TENS unit.  If you have not been contacted after 2 business days, please call [phone] to schedule.      3. Referral to pain psychologist Dr. Marcy Woodson for pain coping skills training.  Please call Jacy to schedule at [phone].    Follow up: 1 month in clinic       To speak with a nurse, schedule/reschedule/cancel a clinic appointment, or request a medication refill call: [phone]     Salud Alvarez, RN, BSN

## 2017-06-06 ENCOUNTER — TELEPHONE (OUTPATIENT)
Dept: ANESTHESIOLOGY | Facility: CLINIC | Age: 67
End: 2017-06-06

## 2017-06-06 NOTE — TELEPHONE ENCOUNTER
Call back to pt regarding PO dilaudid refill.  Pt informed that Dr. Warner is not taking over prescribing this medication and referred her back to Dr. Fink's office for refill.  Pt verbalized understanding.    Salud Alvarez, RN, BSN

## 2017-06-07 DIAGNOSIS — M53.3 COCCYDYNIA: ICD-10-CM

## 2017-06-07 RX ORDER — HYDROMORPHONE HYDROCHLORIDE 2 MG/1
TABLET ORAL
Qty: 120 TABLET | Refills: 0 | Status: SHIPPED | OUTPATIENT
Start: 2017-06-07 | End: 2017-07-10

## 2017-06-07 NOTE — TELEPHONE ENCOUNTER
Pt. Saw pain clinic here 4-26-17 see dictation for plan.  The pain clinic will not take over pain meds.   They advised to wean off Dilaudid since it is not helping pain anyway, add Tylenol 1000mg TID which pt. States has only been taking daily so she will increase,  See Pain PT Tomas Norris  [phone] & see DR. Woodson pain clinic Psychologist here.   Then will need RTC appt. With DR.Van Valdivia in pain clinic.  Refilled Dilaudid 2mg with decreased sig. From 1-2 Q4H prn to 1-2 Q6-8H prn & told pt. Must wean off so next refill will be down to  1 tab Q6-8H prn then 1 tab Q12 H prn then 1 tab daily prn then off.     Told pt. To make all 3 appts. Above & she agreed.   Mailed Dilaudid script.  Call back prn.  Pt. Agreed.  JD.//Tosha Johns RN.

## 2017-07-10 DIAGNOSIS — M53.3 COCCYDYNIA: ICD-10-CM

## 2017-07-10 RX ORDER — HYDROMORPHONE HYDROCHLORIDE 2 MG/1
TABLET ORAL
Qty: 120 TABLET | Refills: 0 | Status: SHIPPED | OUTPATIENT
Start: 2017-07-10 | End: 2017-08-22

## 2017-07-10 NOTE — TELEPHONE ENCOUNTER
See 6-7-17 refill note about weaning plan to decrease Dilaudid until off.  Coccyx Surgery Oct 2016.  Pt. Did not schedule any of the 3 appointments discussed at DR.Van Valdivia's clinic appt. & discussed again over phone 6-7-17.   Also pt. Was told to increase Tylenol to TID & she only increased to BID so  reinstructed pt. To take TID.  States also taking Ibuprofen 4 BID.  States she had knee surgery 2 weeks ago.  C/O tailbone & LBP rated 10 & knee pain less than that.    Refilled  Dilaudid 2mg & decreased sig. To 1 tab every 6-8 hours prn & told pt. Next refill will be BID as discussed in 6-7-17 note.   Mailed to pt.  Pt.agreed to make the 3 appts. Discussed.   is leaving so next appt. With need to be with different provider.  Call back prn.    W.O.//Tosha Johns RN.

## 2017-08-22 DIAGNOSIS — M53.3 COCCYDYNIA: ICD-10-CM

## 2017-08-22 RX ORDER — HYDROMORPHONE HYDROCHLORIDE 2 MG/1
TABLET ORAL
Qty: 60 TABLET | Refills: 0 | Status: SHIPPED | OUTPATIENT
Start: 2017-08-22

## 2017-08-22 NOTE — TELEPHONE ENCOUNTER
Jovita is calling for Dilaudid refill, last filled 7/10/17 #120 with directions 1q8h.  She is crying in pain, states she has been out of dilaudid for 1-2 days, asking for help.  She has not been able to make her pain clinic appts due to distance.  She has history with a number of pain clinics locally in [place], agreed to go back to [facility].  The referral was faxed to [facility] fax [phone], phone [phone].   Pt was given refill for Dilaudid to be taken twice daily for one month, and per 6/7/17 refill plan, the following, final refill will be for one daily for one month.  This will allow two months for Jovita to be established with her local pain clinic.  The Rx was mailed to pt's home per her request.